# Patient Record
Sex: MALE | Race: AMERICAN INDIAN OR ALASKA NATIVE
[De-identification: names, ages, dates, MRNs, and addresses within clinical notes are randomized per-mention and may not be internally consistent; named-entity substitution may affect disease eponyms.]

---

## 2019-04-24 ENCOUNTER — HOSPITAL ENCOUNTER (EMERGENCY)
Dept: HOSPITAL 5 - ED | Age: 69
Discharge: STILL ADMITTED OR ADMINISTRATIVE | End: 2019-04-24
Payer: MEDICARE

## 2019-04-24 VITALS — DIASTOLIC BLOOD PRESSURE: 74 MMHG | SYSTOLIC BLOOD PRESSURE: 124 MMHG

## 2019-04-24 DIAGNOSIS — K59.01: ICD-10-CM

## 2019-04-24 DIAGNOSIS — E86.0: Primary | ICD-10-CM

## 2019-04-24 LAB
ALBUMIN SERPL-MCNC: 3.7 G/DL (ref 3.9–5)
ALT SERPL-CCNC: 23 UNITS/L (ref 7–56)
BASOPHILS # (AUTO): 0 K/MM3 (ref 0–0.1)
BASOPHILS NFR BLD AUTO: 0.6 % (ref 0–1.8)
BILIRUB DIRECT SERPL-MCNC: < 0.2 MG/DL (ref 0–0.2)
BILIRUB UR QL STRIP: (no result)
BLOOD UR QL VISUAL: (no result)
BUN SERPL-MCNC: 20 MG/DL (ref 9–20)
BUN/CREAT SERPL: 29 %
CALCIUM SERPL-MCNC: 9.4 MG/DL (ref 8.4–10.2)
EOSINOPHIL # BLD AUTO: 0 K/MM3 (ref 0–0.4)
EOSINOPHIL NFR BLD AUTO: 0.4 % (ref 0–4.3)
HCT VFR BLD CALC: 45.2 % (ref 35.5–45.6)
HEMOLYSIS INDEX: 19
HGB BLD-MCNC: 14.9 GM/DL (ref 11.8–15.2)
LYMPHOCYTES # BLD AUTO: 2.5 K/MM3 (ref 1.2–5.4)
LYMPHOCYTES NFR BLD AUTO: 32.7 % (ref 13.4–35)
MCHC RBC AUTO-ENTMCNC: 33 % (ref 32–34)
MCV RBC AUTO: 89 FL (ref 84–94)
MONOCYTES # (AUTO): 0.6 K/MM3 (ref 0–0.8)
MONOCYTES % (AUTO): 8.2 % (ref 0–7.3)
MUCOUS THREADS #/AREA URNS HPF: (no result) /HPF
PH UR STRIP: 5 [PH] (ref 5–7)
PLATELET # BLD: 175 K/MM3 (ref 140–440)
PROT UR STRIP-MCNC: (no result) MG/DL
RBC # BLD AUTO: 5.07 M/MM3 (ref 3.65–5.03)
RBC #/AREA URNS HPF: 1 /HPF (ref 0–6)
UROBILINOGEN UR-MCNC: < 2 MG/DL (ref ?–2)
WBC #/AREA URNS HPF: 2 /HPF (ref 0–6)

## 2019-04-24 PROCEDURE — 36415 COLL VENOUS BLD VENIPUNCTURE: CPT

## 2019-04-24 PROCEDURE — 82140 ASSAY OF AMMONIA: CPT

## 2019-04-24 PROCEDURE — 93010 ELECTROCARDIOGRAM REPORT: CPT

## 2019-04-24 PROCEDURE — 99285 EMERGENCY DEPT VISIT HI MDM: CPT

## 2019-04-24 PROCEDURE — 83690 ASSAY OF LIPASE: CPT

## 2019-04-24 PROCEDURE — 80076 HEPATIC FUNCTION PANEL: CPT

## 2019-04-24 PROCEDURE — 82553 CREATINE MB FRACTION: CPT

## 2019-04-24 PROCEDURE — 80048 BASIC METABOLIC PNL TOTAL CA: CPT

## 2019-04-24 PROCEDURE — 74176 CT ABD & PELVIS W/O CONTRAST: CPT

## 2019-04-24 PROCEDURE — 81001 URINALYSIS AUTO W/SCOPE: CPT

## 2019-04-24 PROCEDURE — 84484 ASSAY OF TROPONIN QUANT: CPT

## 2019-04-24 PROCEDURE — 96360 HYDRATION IV INFUSION INIT: CPT

## 2019-04-24 PROCEDURE — 96361 HYDRATE IV INFUSION ADD-ON: CPT

## 2019-04-24 PROCEDURE — 93005 ELECTROCARDIOGRAM TRACING: CPT

## 2019-04-24 PROCEDURE — 82550 ASSAY OF CK (CPK): CPT

## 2019-04-24 PROCEDURE — 85025 COMPLETE CBC W/AUTO DIFF WBC: CPT

## 2019-04-24 PROCEDURE — 70450 CT HEAD/BRAIN W/O DYE: CPT

## 2019-04-24 NOTE — CAT SCAN REPORT
CT HEAD WITHOUT CONTRAST:



HISTORY:  Lightheadedness, dizziness.



TECHNIQUE:  Sequential 2.5mm CT images.



COMPARISON: none.



FINDINGS:



Cerebral Parenchyma: Within normal limits.



Cerebellum:  Within normal limits.



Brainstem:  Within normal limits.



Ventricles: Normal.



Sella:  Normal.



Extra-axial spaces:  Normal.



Basal Cisterns:  Normal.



Intracranial Hemorrhage:  None.



Midline Shift:  None.



Calvarium: Normal.



Sinuses: Normal.



Mastoid Air Cells:  Normal.



Visualized Orbits:  Normal.







IMPRESSION:

Cranial CT scan within normal limits.

## 2019-04-24 NOTE — EVENT NOTE
Date: 04/24/19





Comes in for Dizziness and unsteady walking


Recurrent since 2 years


Has seen ENT doctor two years ago


Exam nl


Able to walk 


Slightly unsteadt


DDx


BPPV and Acute Labrynthitis





Meclizine 12.5 po bid prn


Referral to Dr Donnie Juarez

## 2019-04-24 NOTE — EMERGENCY DEPARTMENT REPORT
- General


Stated complaint: WEAKNESS


Time Seen by Provider: 04/24/19 12:42





- History of Present Illness


Initial comments: 





pt is a 68-year-old male that presented to emergency room for dizziness and 

weakness 3 days.  Patient states he went to his primary care's office for the 

same complaints and was sent here for further evaluation and treatment.  Patient

states that he's also had abdominal pain in his bilateral lower abdomen for 5 

days.  Patient states she has had a bowel movement for 5 days as well.  He 

states he has abdominal pain is a 7 out of 10.  Patient states it is worse with 

palpation and movement and exertion.  Patient states his abdominal pain is 

better with rest.  Patient states the dizziness and weakness better with laying 

flat and rest.  Patient states his dizziness weakness or foot exertion.  Patient

denies nausea vomiting.  Patient denies fever chills.  Patient denies syncope or

visual changes.  Patient states his weakness and dizziness are worsening.  

Patient states she has a history of high blood pressure, diabetes, high 

cholesterol and an insulin dependence.


MD Complaint: generalized weakness


-: Sudden


Location: other (abdominal pain)


Severity: severe


Severity scale (0 -10): 7


Consistency: constant


Improves with: rest


Worsens with: movement, exertion


Associated Symptoms: denies: chest pain, confusion, dark stools, diaphoresis, 

dysuria, easy bruising, fever/chills, headaches, loss of appetite, 

nausea/vomiting, myalgias, rash, shortness of breath, syncope





- Related Data


                                  Previous Rx's











 Medication  Instructions  Recorded  Last Taken  Type


 


Meclizine [Antivert] 12.5 mg PO BID PRN #12 tablet 04/24/19 Unknown Rx











                                    Allergies











Allergy/AdvReac Type Severity Reaction Status Date / Time


 


No Known Allergies Allergy   Unverified 04/24/19 12:54














ED Review of Systems


ROS: 


Stated complaint: WEAKNESS


Other details as noted in HPI





Constitutional: weakness.  denies: chills, fever


Eyes: denies: eye pain, eye discharge, vision change


ENT: denies: ear pain, throat pain


Respiratory: denies: cough, shortness of breath, wheezing


Cardiovascular: denies: chest pain, palpitations


Endocrine: no symptoms reported


Gastrointestinal: abdominal pain, constipation.  denies: nausea, diarrhea


Genitourinary: denies: urgency, dysuria


Musculoskeletal: denies: back pain, joint swelling, arthralgia


Skin: denies: rash, lesions


Neurological: weakness.  denies: headache, paresthesias


Psychiatric: denies: anxiety, depression


Hematological/Lymphatic: denies: easy bleeding, easy bruising





ED Past Medical Hx





- Past Medical History


Previous Medical History?: Yes


Hx Hypertension: Yes


Hx Diabetes: Yes





- Surgical History


Past Surgical History?: No





- Family History


Family history: no significant





- Social History


Smoking Status: Never Smoker


Substance Use Type: None





- Medications


Home Medications: 


                                Home Medications











 Medication  Instructions  Recorded  Confirmed  Last Taken  Type


 


Meclizine [Antivert] 12.5 mg PO BID PRN #12 tablet 04/24/19  Unknown Rx














ED Physical Exam





- General


Limitations: No Limitations


General appearance: alert, in no apparent distress





- Head


Head exam: Present: atraumatic, normocephalic





- Eye


Eye exam: Present: normal appearance





- ENT


ENT exam: Present: mucous membranes moist





- Neck


Neck exam: Present: normal inspection





- Respiratory


Respiratory exam: Present: normal lung sounds bilaterally.  Absent: respiratory 

distress, wheezes, rales, rhonchi





- Cardiovascular


Cardiovascular Exam: Present: regular rate, normal rhythm.  Absent: systolic mu

rmur, diastolic murmur, rubs, gallop





- GI/Abdominal


GI/Abdominal exam: Present: soft, distended, tenderness (bilateral lower 

quadrant tenderness to palpation), normal bowel sounds





- Rectal


Rectal exam: Present: deferred





- Extremities Exam


Extremities exam: Present: normal inspection





- Back Exam


Back exam: Present: normal inspection





- Neurological Exam


Neurological exam: Present: alert, oriented X3





- Psychiatric


Psychiatric exam: Present: normal affect, normal mood





- Skin


Skin exam: Present: warm, dry, intact, normal color.  Absent: rash





- Assessment


Assessment Interval: Baseline





- Level of Consciousness


1a. Level of Consciousness: alert/keenly responsive





- LOC Questions


1b. LOC Questions: answers both correctly





- LOC Command


1c. LOC Commands: performs tasks correctly





- Best Gaze


2. Best Gaze: normal





- Visual


3. Visual: no visual loss





- Facial Palsy


4. Facial Palsy: normal symmetrical movement





- Motor Arm


5a. Motor Arm Left: no drift


5b. Motor Arm Right: no drift





- Motor Leg


6a. Motor Leg Left: no drift


6b. Motor Leg Right: no drift





- Limb Ataxia


7. Limb Ataxia: absent





- Sensory


8. Sensory: normal





- Best Language


9. Best Language: no aphasia





- Dysarthria


10. Dysarthria: normal





- Extinction and Inattention


11. Extinction/Inattention: no abnormality





- Scoring


Total Score: 0


Stroke Severity: No Stroke Symptoms





ED Course


                                   Vital Signs











  04/24/19 04/24/19 04/24/19





  12:47 12:52 12:55


 


Temperature  97.4 F L 


 


Pulse Rate  64 


 


Respiratory  15 18





Rate   


 


Blood Pressure  121/77 





[Right]   


 


O2 Sat by Pulse 98 98 97





Oximetry   














  04/24/19 04/24/19 04/24/19





  13:00 13:16 13:30


 


Temperature   


 


Pulse Rate   


 


Respiratory   





Rate   


 


Blood Pressure   





[Right]   


 


O2 Sat by Pulse 94 97 96





Oximetry   














  04/24/19 04/24/19 04/24/19





  13:58 14:00 14:16


 


Temperature   


 


Pulse Rate   


 


Respiratory   





Rate   


 


Blood Pressure   





[Right]   


 


O2 Sat by Pulse 100 99 97





Oximetry   














  04/24/19 04/24/19 04/24/19





  14:30 14:46 15:00


 


Temperature   98.2 F


 


Pulse Rate   71


 


Respiratory   16





Rate   


 


Blood Pressure   122/81





[Right]   


 


O2 Sat by Pulse 99 97 100





Oximetry   














  04/24/19 04/24/19 04/24/19





  15:16 15:30 15:46


 


Temperature   


 


Pulse Rate   


 


Respiratory   





Rate   


 


Blood Pressure   





[Right]   


 


O2 Sat by Pulse 100 98 100





Oximetry   














  04/24/19 04/24/19 04/24/19





  16:00 16:15 16:30


 


Temperature   


 


Pulse Rate   


 


Respiratory   





Rate   


 


Blood Pressure   





[Right]   


 


O2 Sat by Pulse 99 99 98





Oximetry   














  04/24/19 04/24/19 04/24/19





  16:46 17:00 17:12


 


Temperature   98.4 F


 


Pulse Rate   77


 


Respiratory   13





Rate   


 


Blood Pressure   123/74





[Right]   


 


O2 Sat by Pulse 98 96 98





Oximetry   














  04/24/19





  17:58


 


Temperature 98.1 F


 


Pulse Rate 71


 


Respiratory 16





Rate 


 


Blood Pressure 124/74





[Right] 


 


O2 Sat by Pulse 99





Oximetry 














- Reevaluation(s)


Reevaluation #1: 


Patient ambulatory without problem but states he still feels weak and dizzy but 

states he is feeling a little bit better.  Patient's urine very concentrated.  

UA will be sent.  Patient will be given another liter of fluids.


04/24/19 15:01





Discussed all results with patient.  He would benefit the hospitalist service.  

Patient ambulated again and he states he is still feeling weak and dizzy and 

would desire to stay one night in the hospital.  I will discuss this with the 

hospitalist.


04/24/19 16:12














- Consultations


Consultation #1: 


Hospitalist consulted for admission.  Hospitalist to admit patient.  


04/24/19 16:20








ED Medical Decision Making





- Lab Data


Result diagrams: 


                                 04/24/19 13:11





                                 04/24/19 13:11





- EKG Data


-: EKG Interpreted by Me


EKG shows normal: sinus rhythm, axis, intervals, QRS complexes, ST-T waves


Rate: normal





- Radiology Data


Radiology results: report reviewed








CT ABDOMEN PELVIS WITHOUT CONTRAST: 





HISTORY: abdominal pain. 





COMPARISON: none. 





TECHNIQUE: Helical CT in 1.25mm intervals without IV contrast. 


Sagittal and coronal reconstructions. 








FINDINGS: 





Lung bases: Normal. 





Liver: Normal. 





Biliary system: Normal. 





Pancreas: Normal. 





Spleen: Normal. 





Kidneys/ureters/bladder: Normal. 





Adrenal glands: Normal. 





Aorta: Normal. 





Intestines: Normal. 





Appendix: Normal. 





Pelvic viscera: Normal. 





Ascites: None. 





Adenopathy: None. 





Musculoskeletal: Moderate thoracolumbar spondylosis. 








IMPRESSION: 


Unremarkable CT scan of the abdomen and pelvis without contrast. 


























CT HEAD WITHOUT CONTRAST: 





HISTORY: Lightheadedness, dizziness. 





TECHNIQUE: Sequential 2.5mm CT images. 





COMPARISON: none. 





FINDINGS: 





Cerebral Parenchyma: Within normal limits. 





Cerebellum: Within normal limits. 





Brainstem: Within normal limits. 





Ventricles: Normal. 





Sella: Normal. 





Extra-axial spaces: Normal. 





Basal Cisterns: Normal. 





Intracranial Hemorrhage: None. 





Midline Shift: None. 





Calvarium: Normal. 





Sinuses: Normal. 





Mastoid Air Cells: Normal. 





Visualized Orbits: Normal. 











IMPRESSION: 


Cranial CT scan within normal limits.








- Medical Decision Making





he is a 68-year-old male that presents to emergency room with complaints of 

weakness and dizziness and abdominal pain and constipation.  Patient had labs 

done which were unremarkable.  Patient's EKG was normal.  Patient's head CT and 

abdominal CT were normal.  Clinical findings consistent with dehydration.  S

he'll respond well to therapy.  Patient states he hasn't felt like he can go 

home because he lives by himself.





- Differential Diagnosis


dehydration.  Abdominal pain.  Constipation.  Dizziness/weakness.


Critical care attestation.: 


If time is entered above; I have spent that time in minutes in the direct care 

of this critically ill patient, excluding procedure time.








ED Disposition


Clinical Impression: 


 Dizziness, Weakness, Dehydration





Abdominal pain


Qualifiers:


 Abdominal location: lower abdomen, unspecified Qualified Code(s): R10.30 - 

Lower abdominal pain, unspecified





Constipation


Qualifiers:


 Constipation type: slow transit constipation Qualified Code(s): K59.01 - Slow 

transit constipation





Disposition: DC-09 OP ADMIT IP TO THIS HOSP


Is pt being admited?: Yes


Does the pt Need Aspirin: No


Condition: Serious


Instructions:  Constipation (ED), Dehydration (ED), High Fiber Diet (ED), 

Weakness (ED), Abdominal Pain (ED)


Prescriptions: 


Meclizine [Antivert] 12.5 mg PO BID PRN #12 tablet


 PRN Reason: Vertigo


[facility] 


[facility] - [facility]-5 Days


Time of Disposition: 16:20

## 2020-08-14 ENCOUNTER — OFFICE VISIT (OUTPATIENT)
Dept: URBAN - METROPOLITAN AREA CLINIC 25 | Facility: CLINIC | Age: 70
End: 2020-08-14
Payer: MEDICARE

## 2020-08-14 VITALS
DIASTOLIC BLOOD PRESSURE: 86 MMHG | HEART RATE: 62 BPM | RESPIRATION RATE: 16 BRPM | HEIGHT: 68 IN | SYSTOLIC BLOOD PRESSURE: 146 MMHG | TEMPERATURE: 97.1 F | WEIGHT: 183 LBS | BODY MASS INDEX: 27.74 KG/M2

## 2020-08-14 DIAGNOSIS — R19.4 CHANGE IN BOWEL HABITS: ICD-10-CM

## 2020-08-14 DIAGNOSIS — K59.01 SLOW TRANSIT CONSTIPATION: ICD-10-CM

## 2020-08-14 DIAGNOSIS — L29.0 PERIANAL IRRITATION: ICD-10-CM

## 2020-08-14 DIAGNOSIS — Z86.010 PERSONAL HISTORY OF COLONIC POLYPS: ICD-10-CM

## 2020-08-14 DIAGNOSIS — K62.89 ANAL SPHINCTER INCOMPETENCE: ICD-10-CM

## 2020-08-14 PROCEDURE — 99204 OFFICE O/P NEW MOD 45 MIN: CPT | Performed by: INTERNAL MEDICINE

## 2020-08-14 PROCEDURE — 99244 OFF/OP CNSLTJ NEW/EST MOD 40: CPT | Performed by: INTERNAL MEDICINE

## 2020-08-14 PROCEDURE — G8427 DOCREV CUR MEDS BY ELIG CLIN: HCPCS | Performed by: INTERNAL MEDICINE

## 2020-08-14 PROCEDURE — G9903 PT SCRN TBCO ID AS NON USER: HCPCS | Performed by: INTERNAL MEDICINE

## 2020-08-14 PROCEDURE — 1036F TOBACCO NON-USER: CPT | Performed by: INTERNAL MEDICINE

## 2020-08-14 PROCEDURE — 3017F COLORECTAL CA SCREEN DOC REV: CPT | Performed by: INTERNAL MEDICINE

## 2020-08-14 PROCEDURE — G8420 CALC BMI NORM PARAMETERS: HCPCS | Performed by: INTERNAL MEDICINE

## 2020-08-14 RX ORDER — TAMSULOSIN HYDROCHLORIDE 0.4 MG/1
1 CAPSULE CAPSULE ORAL ONCE A DAY
Status: ACTIVE | COMMUNITY

## 2020-08-14 RX ORDER — LINACLOTIDE 72 UG/1
1 CAPSULE AT LEAST 30 MINUTES BEFORE THE FIRST MEAL OF THE DAY ON AN EMPTY STOMACH CAPSULE, GELATIN COATED ORAL ONCE A DAY
Qty: 90 | Refills: 2 | OUTPATIENT
Start: 2020-08-14

## 2020-08-14 RX ORDER — FOLIC ACID 1 MG/1
1 TABLET TABLET ORAL ONCE A DAY
Status: ACTIVE | COMMUNITY

## 2020-08-14 RX ORDER — LISINOPRIL 5 MG/1
1 TABLET TABLET ORAL ONCE A DAY
Status: ACTIVE | COMMUNITY

## 2020-08-14 RX ORDER — METFORMIN HYDROCHLORIDE 1000 MG/1
1 TABLET WITH A MEAL TABLET, FILM COATED ORAL ONCE A DAY
Status: ACTIVE | COMMUNITY

## 2020-08-14 RX ORDER — ATORVASTATIN CALCIUM 40 MG/1
1 TABLET TABLET, FILM COATED ORAL ONCE A DAY
Status: ACTIVE | COMMUNITY

## 2020-08-14 RX ORDER — MELOXICAM 15 MG/1
1 TABLET TABLET ORAL ONCE A DAY
Status: ACTIVE | COMMUNITY

## 2020-08-14 RX ORDER — METHOTREXATE SODIUM 2.5 MG/1
AS DIRECTED TABLET ORAL
Status: ACTIVE | COMMUNITY

## 2020-08-14 RX ORDER — ASPIRIN 81 MG/1
1 TABLET TABLET, COATED ORAL ONCE A DAY
Status: ACTIVE | COMMUNITY

## 2020-08-14 RX ORDER — METHOCARBAMOL 500 MG/1
1.5 TABLETS TABLET ORAL
Status: ACTIVE | COMMUNITY

## 2020-08-14 NOTE — PHYSICAL EXAM GASTROINTESTINAL
Abdomen , soft, nontender, nondistended , no guarding or rigidity , no masses palpable , normal bowel sounds , Liver and Spleen , no hepatomegaly present , liver nontender , spleen not palpablerectal exam - poor anal sphincter tone. no hemorrhoids. normal perianal skin.

## 2020-08-14 NOTE — HPI-TODAY'S VISIT:
AUG 2020 : feels wet sensation around anal canal X 6 months. feels like there is wetness / moisture like sensation is present constantly. eating high fiber diet. does report constipation / irregular bowels. Has tried linzess previously but was too expensive / cost prohibitive so couldnt get filled.   Colonoscopy in May 2020 revealed medium sized hemorrhoids, 1 cm cecal polyp, 1 cm ascending colon polyp, 5 mm sigmoid colon polyp.  Repeat colonoscopy advised in 3 years.  It appears that the patient had a CT scan in May 2020 which revealed small cysts in the liver and kidney otherwise normal.

## 2020-08-18 ENCOUNTER — TELEPHONE ENCOUNTER (OUTPATIENT)
Dept: URBAN - METROPOLITAN AREA CLINIC 25 | Facility: CLINIC | Age: 70
End: 2020-08-18

## 2020-08-18 RX ORDER — LINACLOTIDE 145 UG/1
1 CAPSULE AT LEAST 30 MINUTES BEFORE THE FIRST MEAL OF THE DAY ON AN EMPTY STOMACH CAPSULE, GELATIN COATED ORAL ONCE A DAY
Qty: 90 | Refills: 2 | OUTPATIENT
Start: 2020-08-18 | End: 2021-05-15

## 2020-09-10 ENCOUNTER — TELEPHONE ENCOUNTER (OUTPATIENT)
Dept: URBAN - METROPOLITAN AREA CLINIC 25 | Facility: CLINIC | Age: 70
End: 2020-09-10

## 2020-10-16 ENCOUNTER — OFFICE VISIT (OUTPATIENT)
Dept: URBAN - METROPOLITAN AREA CLINIC 25 | Facility: CLINIC | Age: 70
End: 2020-10-16

## 2020-10-19 ENCOUNTER — OFFICE VISIT (OUTPATIENT)
Dept: URBAN - METROPOLITAN AREA CLINIC 25 | Facility: CLINIC | Age: 70
End: 2020-10-19
Payer: MEDICARE

## 2020-10-19 DIAGNOSIS — L29.0 PERIANAL IRRITATION: ICD-10-CM

## 2020-10-19 DIAGNOSIS — R19.4 CHANGE IN BOWEL HABITS: ICD-10-CM

## 2020-10-19 DIAGNOSIS — K59.01 SLOW TRANSIT CONSTIPATION: ICD-10-CM

## 2020-10-19 DIAGNOSIS — K62.89 ANAL SPHINCTER INCOMPETENCE: ICD-10-CM

## 2020-10-19 DIAGNOSIS — Z86.010 PERSONAL HISTORY OF COLONIC POLYPS: ICD-10-CM

## 2020-10-19 PROCEDURE — G8417 CALC BMI ABV UP PARAM F/U: HCPCS | Performed by: INTERNAL MEDICINE

## 2020-10-19 PROCEDURE — 3017F COLORECTAL CA SCREEN DOC REV: CPT | Performed by: INTERNAL MEDICINE

## 2020-10-19 PROCEDURE — G8427 DOCREV CUR MEDS BY ELIG CLIN: HCPCS | Performed by: INTERNAL MEDICINE

## 2020-10-19 PROCEDURE — G9903 PT SCRN TBCO ID AS NON USER: HCPCS | Performed by: INTERNAL MEDICINE

## 2020-10-19 PROCEDURE — 99213 OFFICE O/P EST LOW 20 MIN: CPT | Performed by: INTERNAL MEDICINE

## 2020-10-19 PROCEDURE — G8482 FLU IMMUNIZE ORDER/ADMIN: HCPCS | Performed by: INTERNAL MEDICINE

## 2020-10-19 RX ORDER — METHOTREXATE SODIUM 2.5 MG/1
AS DIRECTED TABLET ORAL
Status: ACTIVE | COMMUNITY

## 2020-10-19 RX ORDER — ASPIRIN 81 MG/1
1 TABLET TABLET, COATED ORAL ONCE A DAY
Status: ACTIVE | COMMUNITY

## 2020-10-19 RX ORDER — LISINOPRIL 5 MG/1
1 TABLET TABLET ORAL ONCE A DAY
Status: ACTIVE | COMMUNITY

## 2020-10-19 RX ORDER — MELOXICAM 15 MG/1
1 TABLET TABLET ORAL ONCE A DAY
Status: ACTIVE | COMMUNITY

## 2020-10-19 RX ORDER — TAMSULOSIN HYDROCHLORIDE 0.4 MG/1
1 CAPSULE CAPSULE ORAL ONCE A DAY
Status: ACTIVE | COMMUNITY

## 2020-10-19 RX ORDER — LUBIPROSTONE 24 UG/1
1 CAPSULE WITH FOOD AND WATER CAPSULE, GELATIN COATED ORAL TWICE A DAY
Qty: 180 CAPSULE | Refills: 2 | OUTPATIENT
Start: 2020-10-19

## 2020-10-19 RX ORDER — ATORVASTATIN CALCIUM 40 MG/1
1 TABLET TABLET, FILM COATED ORAL ONCE A DAY
Status: ACTIVE | COMMUNITY

## 2020-10-19 RX ORDER — METFORMIN HYDROCHLORIDE 1000 MG/1
1 TABLET WITH A MEAL TABLET, FILM COATED ORAL ONCE A DAY
Status: ACTIVE | COMMUNITY

## 2020-10-19 RX ORDER — FOLIC ACID 1 MG/1
1 TABLET TABLET ORAL ONCE A DAY
Status: ACTIVE | COMMUNITY

## 2020-10-19 RX ORDER — METHOCARBAMOL 500 MG/1
1.5 TABLETS TABLET ORAL
Status: ACTIVE | COMMUNITY

## 2020-10-19 RX ORDER — LINACLOTIDE 145 UG/1
1 CAPSULE AT LEAST 30 MINUTES BEFORE THE FIRST MEAL OF THE DAY ON AN EMPTY STOMACH CAPSULE, GELATIN COATED ORAL ONCE A DAY
Qty: 90 | Refills: 2 | Status: DISCONTINUED | COMMUNITY
Start: 2020-08-18 | End: 2021-05-15

## 2020-10-19 RX ORDER — LINACLOTIDE 72 UG/1
1 CAPSULE AT LEAST 30 MINUTES BEFORE THE FIRST MEAL OF THE DAY ON AN EMPTY STOMACH CAPSULE, GELATIN COATED ORAL ONCE A DAY
Qty: 90 | Refills: 2 | Status: DISCONTINUED | COMMUNITY
Start: 2020-08-14

## 2020-10-19 NOTE — HPI-TODAY'S VISIT:
Oct 2020:couldnt get linzess because it was too expensive. using dulcolax along with smooth move tea. also using metamucil. feels " wetness" in perineum.samples of linzess were working. continues to have constipation.

## 2020-10-19 NOTE — HPI-OTHER HISTORIES
AUG 2020 : feels wet sensation around anal canal X 6 months. feels like there is wetness / moisture like sensation is present constantly. eating high fiber diet. does report constipation / irregular bowels. Has tried linzess previously but was too expensive / cost prohibitive so couldnt get filled.   Colonoscopy in May 2020 revealed medium sized hemorrhoids, 1 cm cecal polyp, 1 cm ascending colon polyp, 5 mm sigmoid colon polyp.  Repeat colonoscopy advised in 3 years.  It appears that the patient had a CT scan in May 2020 which revealed small cysts in the liver and kidney otherwise normal. DISPLAY PLAN FREE TEXT

## 2020-11-02 ENCOUNTER — TELEPHONE ENCOUNTER (OUTPATIENT)
Dept: URBAN - METROPOLITAN AREA CLINIC 92 | Facility: CLINIC | Age: 70
End: 2020-11-02

## 2020-12-18 ENCOUNTER — DASHBOARD ENCOUNTERS (OUTPATIENT)
Age: 70
End: 2020-12-18

## 2020-12-18 ENCOUNTER — OFFICE VISIT (OUTPATIENT)
Dept: URBAN - METROPOLITAN AREA CLINIC 25 | Facility: CLINIC | Age: 70
End: 2020-12-18
Payer: MEDICARE

## 2020-12-18 DIAGNOSIS — L29.0 PERIANAL IRRITATION: ICD-10-CM

## 2020-12-18 DIAGNOSIS — K59.01 SLOW TRANSIT CONSTIPATION: ICD-10-CM

## 2020-12-18 DIAGNOSIS — K62.89 ANAL SPHINCTER INCOMPETENCE: ICD-10-CM

## 2020-12-18 DIAGNOSIS — Z86.010 PERSONAL HISTORY OF COLONIC POLYPS: ICD-10-CM

## 2020-12-18 DIAGNOSIS — R19.4 CHANGE IN BOWEL HABITS: ICD-10-CM

## 2020-12-18 PROCEDURE — 99213 OFFICE O/P EST LOW 20 MIN: CPT | Performed by: INTERNAL MEDICINE

## 2020-12-18 PROCEDURE — G8427 DOCREV CUR MEDS BY ELIG CLIN: HCPCS | Performed by: INTERNAL MEDICINE

## 2020-12-18 PROCEDURE — G9903 PT SCRN TBCO ID AS NON USER: HCPCS | Performed by: INTERNAL MEDICINE

## 2020-12-18 PROCEDURE — G8482 FLU IMMUNIZE ORDER/ADMIN: HCPCS | Performed by: INTERNAL MEDICINE

## 2020-12-18 PROCEDURE — 3017F COLORECTAL CA SCREEN DOC REV: CPT | Performed by: INTERNAL MEDICINE

## 2020-12-18 PROCEDURE — G8417 CALC BMI ABV UP PARAM F/U: HCPCS | Performed by: INTERNAL MEDICINE

## 2020-12-18 RX ORDER — METHOTREXATE SODIUM 2.5 MG/1
AS DIRECTED TABLET ORAL
Status: ACTIVE | COMMUNITY

## 2020-12-18 RX ORDER — ASPIRIN 81 MG/1
1 TABLET TABLET, COATED ORAL ONCE A DAY
Status: ACTIVE | COMMUNITY

## 2020-12-18 RX ORDER — TAMSULOSIN HYDROCHLORIDE 0.4 MG/1
1 CAPSULE CAPSULE ORAL ONCE A DAY
Status: ACTIVE | COMMUNITY

## 2020-12-18 RX ORDER — FOLIC ACID 1 MG/1
1 TABLET TABLET ORAL ONCE A DAY
Status: ACTIVE | COMMUNITY

## 2020-12-18 RX ORDER — METFORMIN HYDROCHLORIDE 1000 MG/1
1 TABLET WITH A MEAL TABLET, FILM COATED ORAL ONCE A DAY
Status: ACTIVE | COMMUNITY

## 2020-12-18 RX ORDER — LISINOPRIL 5 MG/1
1 TABLET TABLET ORAL ONCE A DAY
Status: ACTIVE | COMMUNITY

## 2020-12-18 RX ORDER — METHOCARBAMOL 500 MG/1
1.5 TABLETS TABLET ORAL
Status: ACTIVE | COMMUNITY

## 2020-12-18 RX ORDER — MELOXICAM 15 MG/1
1 TABLET TABLET ORAL ONCE A DAY
Status: ACTIVE | COMMUNITY

## 2020-12-18 RX ORDER — ATORVASTATIN CALCIUM 40 MG/1
1 TABLET TABLET, FILM COATED ORAL ONCE A DAY
Status: ACTIVE | COMMUNITY

## 2020-12-18 RX ORDER — LUBIPROSTONE 24 UG/1
1 CAPSULE WITH FOOD AND WATER CAPSULE, GELATIN COATED ORAL TWICE A DAY
Qty: 180 CAPSULE | Refills: 2 | Status: DISCONTINUED | COMMUNITY
Start: 2020-10-19

## 2020-12-18 NOTE — HPI-OTHER HISTORIES
Oct 2020:couldnt get linzess because it was too expensive. using dulcolax along with smooth move tea. also using metamucil. feels " wetness" in perineum.samples of linzess were working. continues to have constipation.  AUG 2020 : feels wet sensation around anal canal X 6 months. feels like there is wetness / moisture like sensation is present constantly. eating high fiber diet. does report constipation / irregular bowels. Has tried linzess previously but was too expensive / cost prohibitive so couldnt get filled.   Colonoscopy in May 2020 revealed medium sized hemorrhoids, 1 cm cecal polyp, 1 cm ascending colon polyp, 5 mm sigmoid colon polyp.  Repeat colonoscopy advised in 3 years.  It appears that the patient had a CT scan in May 2020 which revealed small cysts in the liver and kidney otherwise normal.

## 2020-12-18 NOTE — HPI-TODAY'S VISIT:
Dec 2020: unable to take amitiza because too expensive. taking metamucil, miralax, stool softners. does drink water. stomach is bloated / growling.

## 2021-03-19 ENCOUNTER — OFFICE VISIT (OUTPATIENT)
Dept: URBAN - METROPOLITAN AREA CLINIC 25 | Facility: CLINIC | Age: 71
End: 2021-03-19

## 2021-03-19 PROBLEM — 428283002 HISTORY OF POLYP OF COLON (SITUATION): Status: ACTIVE | Noted: 2020-08-14

## 2021-03-19 PROBLEM — 35298007 SLOW TRANSIT CONSTIPATION: Status: ACTIVE | Noted: 2020-08-14

## 2021-03-19 RX ORDER — FOLIC ACID 1 MG/1
1 TABLET TABLET ORAL ONCE A DAY
Status: ACTIVE | COMMUNITY

## 2021-03-19 RX ORDER — METFORMIN HYDROCHLORIDE 1000 MG/1
1 TABLET WITH A MEAL TABLET, FILM COATED ORAL ONCE A DAY
Status: ACTIVE | COMMUNITY

## 2021-03-19 RX ORDER — METHOCARBAMOL 500 MG/1
1.5 TABLETS TABLET ORAL
Status: ACTIVE | COMMUNITY

## 2021-03-19 RX ORDER — METHOTREXATE SODIUM 2.5 MG/1
AS DIRECTED TABLET ORAL
Status: ACTIVE | COMMUNITY

## 2021-03-19 RX ORDER — ATORVASTATIN CALCIUM 40 MG/1
1 TABLET TABLET, FILM COATED ORAL ONCE A DAY
Status: ACTIVE | COMMUNITY

## 2021-03-19 RX ORDER — MELOXICAM 15 MG/1
1 TABLET TABLET ORAL ONCE A DAY
Status: ACTIVE | COMMUNITY

## 2021-03-19 RX ORDER — LISINOPRIL 5 MG/1
1 TABLET TABLET ORAL ONCE A DAY
Status: ACTIVE | COMMUNITY

## 2021-03-19 RX ORDER — ASPIRIN 81 MG/1
1 TABLET TABLET, COATED ORAL ONCE A DAY
Status: ACTIVE | COMMUNITY

## 2021-03-19 RX ORDER — TAMSULOSIN HYDROCHLORIDE 0.4 MG/1
1 CAPSULE CAPSULE ORAL ONCE A DAY
Status: ACTIVE | COMMUNITY

## 2021-03-19 NOTE — HPI-OTHER HISTORIES
Dec 2020: unable to take amitiza because too expensive. taking metamucil, miralax, stool softners. does drink water. stomach is bloated / growling.   Oct 2020:couldnt get linzess because it was too expensive. using dulcolax along with smooth move tea. also using metamucil. feels " wetness" in perineum.samples of linzess were working. continues to have constipation.  AUG 2020 : feels wet sensation around anal canal X 6 months. feels like there is wetness / moisture like sensation is present constantly. eating high fiber diet. does report constipation / irregular bowels. Has tried linzess previously but was too expensive / cost prohibitive so couldnt get filled.   Colonoscopy in May 2020 revealed medium sized hemorrhoids, 1 cm cecal polyp, 1 cm ascending colon polyp, 5 mm sigmoid colon polyp.  Repeat colonoscopy advised in 3 years.  It appears that the patient had a CT scan in May 2020 which revealed small cysts in the liver and kidney otherwise normal.

## 2024-09-05 NOTE — CAT SCAN REPORT
CT ABDOMEN PELVIS WITHOUT CONTRAST:



HISTORY:  abdominal pain.



COMPARISON: none.



TECHNIQUE:  Helical CT in 1.25mm intervals without IV contrast. 

Sagittal and coronal reconstructions. 





FINDINGS:



Lung bases: Normal.



Liver: Normal.



Biliary system: Normal.



Pancreas: Normal.



Spleen: Normal.



Kidneys/ureters/bladder: Normal.



Adrenal glands: Normal.



Aorta: Normal.



Intestines: Normal.



Appendix: Normal.



Pelvic viscera: Normal.



Ascites: None.



Adenopathy: None.



Musculoskeletal: Moderate thoracolumbar spondylosis.





IMPRESSION:

Unremarkable CT scan of the abdomen and pelvis without contrast. Attending Discharge Physical Examination: